# Patient Record
Sex: FEMALE | Race: OTHER | HISPANIC OR LATINO | URBAN - METROPOLITAN AREA
[De-identification: names, ages, dates, MRNs, and addresses within clinical notes are randomized per-mention and may not be internally consistent; named-entity substitution may affect disease eponyms.]

---

## 2024-07-29 ENCOUNTER — EMERGENCY (EMERGENCY)
Facility: HOSPITAL | Age: 19
LOS: 1 days | Discharge: HOME | End: 2024-07-29
Attending: EMERGENCY MEDICINE | Admitting: EMERGENCY MEDICINE
Payer: MEDICAID

## 2024-07-29 VITALS
HEIGHT: 63 IN | DIASTOLIC BLOOD PRESSURE: 79 MMHG | SYSTOLIC BLOOD PRESSURE: 131 MMHG | OXYGEN SATURATION: 100 % | TEMPERATURE: 98 F | WEIGHT: 149.91 LBS | HEART RATE: 80 BPM | RESPIRATION RATE: 17 BRPM

## 2024-07-29 PROCEDURE — 99284 EMERGENCY DEPT VISIT MOD MDM: CPT

## 2024-07-29 RX ORDER — CIPROFLOXACIN AND FLUOCINOLONE ACETONIDE .75; .0625 MG/.25ML; MG/.25ML
0.25 SOLUTION AURICULAR (OTIC) ONCE
Refills: 0 | Status: DISCONTINUED | OUTPATIENT
Start: 2024-07-29 | End: 2024-07-30

## 2024-07-29 NOTE — ED PROVIDER NOTE - CLINICAL SUMMARY MEDICAL DECISION MAKING FREE TEXT BOX
Left ear pain.  No systemic symptoms to suggest otitis media or sinus congestion.  There is swelling of the canal so otitis externa is a possibility.  Will treat with topical antibiotics and refer to ENT for follow-up.

## 2024-07-29 NOTE — ED ADULT TRIAGE NOTE - AS TEMP SITE
This is being sent to you without review by the Triage staff due to the high call volumes created by the COVID-19 virus. Thank you for your support.      Centralized Nurse Triage Team
oral

## 2024-07-29 NOTE — ED PROVIDER NOTE - CARE PROVIDER_API CALL
Halley Holbrook  Otolaryngology  [address] [place] [id number]  Phone: [phone]  Fax: [phone]  Follow Up Time: 1-3 Days

## 2024-07-29 NOTE — ED PROVIDER NOTE - PATIENT PORTAL LINK FT
You can access the FollowMyHealth Patient Portal offered by [facility] by registering at the following website: [email or web address]. By joining [employer]’s FollowMyHealth portal, you will also be able to view your health information using other applications (apps) compatible with our system.

## 2024-07-29 NOTE — ED ADULT TRIAGE NOTE - CHIEF COMPLAINT QUOTE
PT c/o left sided facial pain x 2 days. Endorses swelling. Denies fever, chills, drainage from ear, hearing loss, No Past Medical History

## 2024-07-29 NOTE — ED PROVIDER NOTE - NSFOLLOWUPINSTRUCTIONS_ED_ALL_ED_FT
Follow up with Dr. Holbrook, ENT doctor in 2-3 days.    Take ibuprofen 600mg every 6 hours as needed for pain for 3-5 days maximum.  Take acetaminophen 975mg every 6 hours as needed for pain.  YOU CAN TAKE BOTH OF THESE MEDICATIONS AT THE SAME TIME.     Use the Cipro ear drops, 3 drops in the left ear twice a day for 7 days.    Return for worse pain, fever, vomiting or other emergent concerns.

## 2024-07-30 ENCOUNTER — EMERGENCY (EMERGENCY)
Facility: HOSPITAL | Age: 19
LOS: 1 days | Discharge: HOME | End: 2024-07-30
Attending: STUDENT IN AN ORGANIZED HEALTH CARE EDUCATION/TRAINING PROGRAM | Admitting: STUDENT IN AN ORGANIZED HEALTH CARE EDUCATION/TRAINING PROGRAM
Payer: MEDICAID

## 2024-07-30 VITALS
HEART RATE: 85 BPM | TEMPERATURE: 98 F | OXYGEN SATURATION: 99 % | RESPIRATION RATE: 18 BRPM | DIASTOLIC BLOOD PRESSURE: 82 MMHG | WEIGHT: 149.91 LBS | SYSTOLIC BLOOD PRESSURE: 135 MMHG | HEIGHT: 63 IN

## 2024-07-30 VITALS
TEMPERATURE: 98 F | SYSTOLIC BLOOD PRESSURE: 111 MMHG | DIASTOLIC BLOOD PRESSURE: 73 MMHG | RESPIRATION RATE: 16 BRPM | OXYGEN SATURATION: 100 % | HEART RATE: 73 BPM

## 2024-07-30 PROBLEM — Z78.9 OTHER SPECIFIED HEALTH STATUS: Chronic | Status: ACTIVE | Noted: 2020-08-04

## 2024-07-30 LAB
ALBUMIN SERPL ELPH-MCNC: 3.9 G/DL — SIGNIFICANT CHANGE UP (ref 3.3–5)
ALP SERPL-CCNC: 80 U/L — SIGNIFICANT CHANGE UP (ref 40–120)
ALT FLD-CCNC: 13 U/L — SIGNIFICANT CHANGE UP (ref 4–33)
ANION GAP SERPL CALC-SCNC: 14 MMOL/L — SIGNIFICANT CHANGE UP (ref 7–14)
ANISOCYTOSIS BLD QL: SIGNIFICANT CHANGE UP
AST SERPL-CCNC: 28 U/L — SIGNIFICANT CHANGE UP (ref 4–32)
BASOPHILS # BLD AUTO: 0 K/UL — SIGNIFICANT CHANGE UP (ref 0–0.2)
BASOPHILS NFR BLD AUTO: 0 % — SIGNIFICANT CHANGE UP (ref 0–2)
BILIRUB SERPL-MCNC: 0.4 MG/DL — SIGNIFICANT CHANGE UP (ref 0.2–1.2)
BUN SERPL-MCNC: 16 MG/DL — SIGNIFICANT CHANGE UP (ref 7–23)
CALCIUM SERPL-MCNC: 8.8 MG/DL — SIGNIFICANT CHANGE UP (ref 8.4–10.5)
CHLORIDE SERPL-SCNC: 104 MMOL/L — SIGNIFICANT CHANGE UP (ref 98–107)
CO2 SERPL-SCNC: 18 MMOL/L — LOW (ref 22–31)
CREAT SERPL-MCNC: 0.63 MG/DL — SIGNIFICANT CHANGE UP (ref 0.5–1.3)
EGFR: 130 ML/MIN/1.73M2 — SIGNIFICANT CHANGE UP
EOSINOPHIL # BLD AUTO: 0 K/UL — SIGNIFICANT CHANGE UP (ref 0–0.5)
EOSINOPHIL NFR BLD AUTO: 0 % — SIGNIFICANT CHANGE UP (ref 0–6)
GLUCOSE SERPL-MCNC: 82 MG/DL — SIGNIFICANT CHANGE UP (ref 70–99)
HCT VFR BLD CALC: 31.7 % — LOW (ref 34.5–45)
HGB BLD-MCNC: 9.8 G/DL — LOW (ref 11.5–15.5)
HYPOCHROMIA BLD QL: SIGNIFICANT CHANGE UP
IANC: 6.44 K/UL — SIGNIFICANT CHANGE UP (ref 1.8–7.4)
LYMPHOCYTES # BLD AUTO: 2.41 K/UL — SIGNIFICANT CHANGE UP (ref 1–3.3)
LYMPHOCYTES # BLD AUTO: 25.9 % — SIGNIFICANT CHANGE UP (ref 13–44)
MCHC RBC-ENTMCNC: 21.4 PG — LOW (ref 27–34)
MCHC RBC-ENTMCNC: 30.9 GM/DL — LOW (ref 32–36)
MCV RBC AUTO: 69.2 FL — LOW (ref 80–100)
MICROCYTES BLD QL: SIGNIFICANT CHANGE UP
MONOCYTES # BLD AUTO: 0.8 K/UL — SIGNIFICANT CHANGE UP (ref 0–0.9)
MONOCYTES NFR BLD AUTO: 8.6 % — SIGNIFICANT CHANGE UP (ref 2–14)
NEUTROPHILS # BLD AUTO: 5.93 K/UL — SIGNIFICANT CHANGE UP (ref 1.8–7.4)
NEUTROPHILS NFR BLD AUTO: 63.8 % — SIGNIFICANT CHANGE UP (ref 43–77)
PLAT MORPH BLD: ABNORMAL
PLATELET # BLD AUTO: 271 K/UL — SIGNIFICANT CHANGE UP (ref 150–400)
PLATELET COUNT - ESTIMATE: NORMAL — SIGNIFICANT CHANGE UP
POIKILOCYTOSIS BLD QL AUTO: SLIGHT — SIGNIFICANT CHANGE UP
POLYCHROMASIA BLD QL SMEAR: SIGNIFICANT CHANGE UP
POTASSIUM SERPL-MCNC: 4.6 MMOL/L — SIGNIFICANT CHANGE UP (ref 3.5–5.3)
POTASSIUM SERPL-SCNC: 4.6 MMOL/L — SIGNIFICANT CHANGE UP (ref 3.5–5.3)
PROT SERPL-MCNC: 7.4 G/DL — SIGNIFICANT CHANGE UP (ref 6–8.3)
RBC # BLD: 4.58 M/UL — SIGNIFICANT CHANGE UP (ref 3.8–5.2)
RBC # FLD: 19 % — HIGH (ref 10.3–14.5)
RBC BLD AUTO: ABNORMAL
SODIUM SERPL-SCNC: 136 MMOL/L — SIGNIFICANT CHANGE UP (ref 135–145)
VARIANT LYMPHS # BLD: 1.7 % — SIGNIFICANT CHANGE UP (ref 0–6)
WBC # BLD: 9.29 K/UL — SIGNIFICANT CHANGE UP (ref 3.8–10.5)
WBC # FLD AUTO: 9.29 K/UL — SIGNIFICANT CHANGE UP (ref 3.8–10.5)

## 2024-07-30 PROCEDURE — 99285 EMERGENCY DEPT VISIT HI MDM: CPT

## 2024-07-30 PROCEDURE — 70481 CT ORBIT/EAR/FOSSA W/DYE: CPT | Mod: 26,MC

## 2024-07-30 RX ORDER — CEFEPIME 1 G/1
2000 INJECTION, POWDER, FOR SOLUTION INTRAMUSCULAR; INTRAVENOUS ONCE
Refills: 0 | Status: COMPLETED | OUTPATIENT
Start: 2024-07-30 | End: 2024-07-30

## 2024-07-30 RX ORDER — VANCOMYCIN HYDROCHLORIDE 50 MG/ML
1000 KIT ORAL ONCE
Refills: 0 | Status: COMPLETED | OUTPATIENT
Start: 2024-07-30 | End: 2024-07-30

## 2024-07-30 RX ORDER — ACETAMINOPHEN 325 MG
1000 TABLET ORAL ONCE
Refills: 0 | Status: COMPLETED | OUTPATIENT
Start: 2024-07-30 | End: 2024-07-30

## 2024-07-30 RX ORDER — CIPROFLOXACIN HYDROCHLORIDE AND HYDROCORTISONE 2; 10 MG/ML; MG/ML
3 SUSPENSION AURICULAR (OTIC) ONCE
Refills: 0 | Status: ACTIVE | OUTPATIENT
Start: 2024-07-30 | End: 2024-07-30

## 2024-07-30 RX ORDER — ACETAMINOPHEN 325 MG
1000 TABLET ORAL ONCE
Refills: 0 | Status: DISCONTINUED | OUTPATIENT
Start: 2024-07-30 | End: 2024-07-31

## 2024-07-30 RX ADMIN — CEFEPIME 100 MILLIGRAM(S): 1 INJECTION, POWDER, FOR SOLUTION INTRAMUSCULAR; INTRAVENOUS at 15:36

## 2024-07-30 RX ADMIN — Medication 600 MILLIGRAM(S): at 00:24

## 2024-07-30 RX ADMIN — Medication 600 MILLIGRAM(S): at 20:49

## 2024-07-30 RX ADMIN — VANCOMYCIN HYDROCHLORIDE 250 MILLIGRAM(S): KIT at 16:45

## 2024-07-30 RX ADMIN — Medication 400 MILLIGRAM(S): at 14:52

## 2024-07-30 NOTE — ED PROVIDER NOTE - PROGRESS NOTE DETAILS
MD Mchugh: Patient initially signed out to me pending CT IAC d/t concern for mastoiditis. CT concerning for otitis externa w/ mild otomastoiditis. ENT consulted for eval. Per ENT, no need for acute intervention. Culture sent off and Left ear wick placed. Recommending Ciprodex gtts to affected ear and PO augmentin, then outpatient ENT follow up. Pt was re-evaluated at bedside, VSS, feeling better overall. Results were discussed with patient as well as return precautions and follow up plan with PCP and/or ENT. Time was taken to answer any questions that the patient had before providing them with discharge paperwork.

## 2024-07-30 NOTE — ED ADULT NURSE REASSESSMENT NOTE - NS ED NURSE REASSESS COMMENT FT1
pt remains a&ox4, c/o left ear pain. Pt denying chest pain, sob, n+v, headache, dizziness at this time. Breathing even, unlabored. Pending ENT.

## 2024-07-30 NOTE — ED PROVIDER NOTE - PHYSICAL EXAMINATION
Const: Awake, alert, no acute distress.  Well appearing.  Moving comfortably on stretcher.  HEENT: NC/AT.  Moist mucous membranes.  No pharyngeal erythema, no exudates. Extraocular movements intact b/l.  Conjunctiva pink.  No scleral icterus. Narrowed external auditory canal, poorly visualized tympanic membrane, purulent drainage from the left ear. TTP over the left mastoid area w/ mild erythema.   Neck: Neck supple, full ROM without pain.  Resp: Speaking in full sentences. No evidence of respiratory distress.   Normal chest excursion.   Neuro: Awake, alert & oriented x 3.  Moves all extremities spontaneously.  No focal deficits.

## 2024-07-30 NOTE — ED ADULT NURSE NOTE - NS ED NOTE  TALK SOMEONE YN
Patient reports a stomach ache since this morning, denies nausea reports that she had a little diarrhea this morning but that has subsided. Denies any fevers. No

## 2024-07-30 NOTE — ED PROVIDER NOTE - PATIENT PORTAL LINK FT
You can access the FollowMyHealth Patient Portal offered by [facility] by registering at the following website: [email or web address]. By joining [employer]’s FollowMyHealth portal, you will also be able to view your health information using other applications (apps) compatible with our system.

## 2024-07-30 NOTE — ED ADULT NURSE NOTE - OBJECTIVE STATEMENT
Patient received in intake room 15. Patient is AOx4, ambulatory, able to speak in full sentences, c/o left sided facial and ear pain. Patient has no past medical history. Patient states she has been having this facial/ear pain with decreased hearing in left ear. Patient medicated per chart. Patient appears comfortable on stretcher. Patient in no signs of acute distress. Patient discharged pending ear drops for ear infection. Respirations even and unlabored, chest rise symmetrical b/l. Safety maintained.

## 2024-07-30 NOTE — ED PROVIDER NOTE - NSFOLLOWUPINSTRUCTIONS_ED_ALL_ED_FT
Otitis Externa    Otitis externa is a bacterial or fungal infection of the outer ear canal. This is the area from the eardrum to the outside of the ear. Otitis externa is sometimes called "swimmer's ear." Causes include swimming in dirty water, moisture remaining in ear after swimming or bathing, trauma to the ear, objects stuck in the ear, or cuts or scrapes in the outside of the ear. Symptoms include itching, pain, swelling, redness in the ear canal, and fluid coming from the ear. To prevent recurrence of otitis externa, keep your ear dry, avoid scratching or putting objects inside your ear including cotton swabs, and avoid swimming in polluted water or poorly chlorinated pools.    Please use Ciprodex drops to affected ear twice a day (5 drops) and Augmentin 1 tab twice a day for 5 days.     Please keep your Friday appointment with your ENT to remove ear wick placed in ED.    SEEK IMMEDIATE MEDICAL CARE IF YOU HAVE ANY OF THE FOLLOWING SYMPTOMS: fever, worsening symptoms, headache, redness/swelling/pain over the bone behind your ear.

## 2024-07-30 NOTE — ED PROVIDER NOTE - CLINICAL SUMMARY MEDICAL DECISION MAKING FREE TEXT BOX
19-year-old female returns to the ED due to worsening left-sided ear pain.  Patient states that she was brought in and started on antibiotics yesterday however the ear pain has not subsided.  Patient also endorses pain ear pulling and pain behind her ear.  Patient denies any nausea, vomiting, fever. DDx includes malignant otitis externa, mastoiditis.  Dispo pending labs, imaging and reassessment.

## 2024-07-30 NOTE — ED PROVIDER NOTE - WET READ LAUNCH FT
There are no Wet Read(s) to document. Otolaryngologist Procedure Text (E): After obtaining clear surgical margins the patient was sent to otolaryngology for surgical repair.  The patient understands they will receive post-surgical care and follow-up from the referring physician's office.

## 2024-07-30 NOTE — ED PROVIDER NOTE - ATTENDING CONTRIBUTION TO CARE
I have discussed the patient's case presentation with resident. I have also personally performed a face-to-face evaluation of the patient. I agree with the resident's assessment and plan.    Exam highly concerning for malignant OE. Will pursue CT IAC and consult ENT if there is e/o mastoiditis. Will start vancomycin and cefepime for now.

## 2024-07-31 RX ORDER — ACETAMINOPHEN 325 MG
975 TABLET ORAL ONCE
Refills: 0 | Status: COMPLETED | OUTPATIENT
Start: 2024-07-31 | End: 2024-07-31

## 2024-07-31 RX ORDER — OXYCODONE HYDROCHLORIDE 100 MG/5ML
1 SOLUTION ORAL
Qty: 12 | Refills: 0
Start: 2024-07-31 | End: 2024-08-02

## 2024-07-31 RX ORDER — AMOXICILLIN/POTASSIUM CLAV 250-125 MG
1 TABLET ORAL
Qty: 10 | Refills: 0
Start: 2024-07-31 | End: 2024-08-04

## 2024-07-31 RX ORDER — CIPROFLOXACIN AND DEXAMETHASONE 3; 1 MG/ML; MG/ML
5 SUSPENSION/ DROPS AURICULAR (OTIC)
Qty: 1 | Refills: 0
Start: 2024-07-31 | End: 2024-08-04

## 2024-07-31 RX ADMIN — Medication 975 MILLIGRAM(S): at 00:08

## 2024-07-31 NOTE — CONSULT NOTE ADULT - SUBJECTIVE AND OBJECTIVE BOX
HPI: 19y Female presenting with L ear pain for past three days. Patient says ear began to hurt three days ago, she came to the ED yesterday and was placed on otic drops but had severe pain today so she re-presented. Endorses muffled hearing in left ear and intermittent tinnitus. No drainage from ear. No recent ear infections or ear tubes. No recent swimming or water exposure. No fevers at home. No dizziness or vertigo.     Allergies    No Known Allergies    Intolerances        PAST MEDICAL & SURGICAL HISTORY:  No pertinent past medical history      No pertinent past medical history      No significant past surgical history      No significant past surgical history          FAMILY HISTORY:        MEDICATIONS:      All other PRN medications:      Vital Signs Last 24 Hrs  T(C): 36.8 (30 Jul 2024 20:43), Max: 36.8 (30 Jul 2024 20:43)  T(F): 98.2 (30 Jul 2024 20:43), Max: 98.2 (30 Jul 2024 20:43)  HR: 73 (30 Jul 2024 20:43) (73 - 85)  BP: 111/73 (30 Jul 2024 20:43) (111/73 - 135/82)  BP(mean): --  RR: 16 (30 Jul 2024 20:43) (16 - 18)  SpO2: 100% (30 Jul 2024 20:43) (99% - 100%)    Parameters below as of 30 Jul 2024 20:43  Patient On (Oxygen Delivery Method): room air        LABS:  CBC-    07-30    136  |  104  |  16  ----------------------------<  82  4.6   |  18<L>  |  0.63    Ca    8.8      30 Jul 2024 14:12    TPro  7.4  /  Alb  3.9  /  TBili  0.4  /  DBili  x   /  AST  28  /  ALT  13  /  AlkPhos  80  07-30    Coagulation Studies-    Endocrine Panel-  --  --  8.8 mg/dL    Physical Exam:  General: well-developed, NAD  OU: EOMI; PERRL; no drainage or redness  AU: R - no mastoid tip TTP, TM no effusion, no erythema. L - no mastoid tip TTP, +tragal TTP, EAC edema w/ yellow-white discharge, suctioned, TM not well visualized due to edema, wick placed  Nose: nares patent  Mouth: No oral lesions; no gross abnormalities  Neck: trachea midline. L level 2 LN, nontender, mobile  Respiratory: unlabored respirations  Cardiovascular: regular rate  Gastrointestinal: Soft, nondistended  Extremities: No edema, warm and well perfused  Skin: No lesions; no rash  CN 7 intact b/l    Imaging:  < from: CT Internal Auditory Canals w/ IV Cont (07.30.24 @ 18:32) >    FINDINGS:    On the right, the external auditory canal, tympanic membrane, middle ear   cavity, ossicles, cochlea, vestibule, semicircular canals, mastoid,   aditus ad antrum, and internal auditory canals are all negative. The   scutum is sharp and there are no ossicular erosions. The round window   niche, sinus tympani, and facial nerve recess are clear. The facial nerve   and carotid canal follow the usual trajectory. The vestibular aqueduct is   not distended.    On the left, there is marked thickening of the cartilaginous segment of   the external auditory canal, with complete opacification of the lumen of   the skeletal segment. This becomes confluent with the tympanic membrane.   There are associated mild mucosal opacities in the middle ear cavity,   with opacification of Prussak space. The stapes is not well seen due to   volume averaging artifact with adjacent mucous. The round window niche,   sinus tympani, and facial nerve recess are opacified. There are mild   mucosal densities in the mastoid cavity, without coalescence or septal   destruction.    The appearance is likely postinflammatory, with external otitis and mild   otomastoiditis. Correlation with direct inspection will be required to   exclude cholesteatoma.    There is reactive lymphadenopathy in the left neck and left parotid   space, with a dominant left parotid nodule measuring 10 mm. Anindex left   level IIa lymph node measures 12 x 8 x 16 mm. Follow-up to resolution   recommended to exclude neoplasm.    Otherwise, the left ossicles, cochlea, vestibule, semicircular canals,   mastoid, aditus ad antrum, and internal auditory canals are otherwise   negative. The scutum is sharp and there are no ossicular erosions. The   facial nerve and carotid canal follow the usual trajectory. The   vestibular aqueduct is not distended.    There are mild to moderate retention cysts in the left maxillary sinus,   and minimal mucus in the left maxillary sinus. There are no air-fluid   levels. Limited views of the brain parenchyma at the skull base and the   sinus cavities appear otherwise unremarkable.      IMPRESSION:    1.  Left external otitis, with mild otomastoiditis and reactive   adenopathy.    < end of copied text >

## 2024-07-31 NOTE — CONSULT NOTE ADULT - ASSESSMENT
19yF no PMH p/w L ear pain for past 3d, was seen in ED 7/29 and discharged on otic drops but returned due to significant ear pain. Exam c/w L OE, wick placed. CT IAC reviewed and c/f mild L mastoid effusion, and L level 2 LN.    Plan:  - Patient has appointment to have ear wick removed on Friday  - Augmentin BID x 10d  - Ciprodex gtt 5 drops to L ear BID x 7d  - F/u L ear culture  - Monitor enlarged lymph node, likely reactive from acute OE    Page ENT with any questions/concerns.  Peds Page #[id number]  Adult Page #[id number]

## 2024-08-02 PROBLEM — Z78.9 NON-SMOKER: Status: ACTIVE | Noted: 2024-08-02

## 2024-08-02 PROBLEM — H65.199 ACUTE OTITIS MEDIA WITH EFFUSION: Status: ACTIVE | Noted: 2024-08-02

## 2024-08-02 PROBLEM — H69.92 DYSFUNCTION OF LEFT EUSTACHIAN TUBE: Status: ACTIVE | Noted: 2024-08-02

## 2024-08-02 PROBLEM — H60.502 ACUTE OTITIS EXTERNA OF LEFT EAR, UNSPECIFIED TYPE: Status: ACTIVE | Noted: 2024-08-02

## 2024-08-02 PROBLEM — Z86.2 HISTORY OF ANEMIA: Status: RESOLVED | Noted: 2024-08-02 | Resolved: 2024-08-02

## 2024-08-02 LAB
CULTURE RESULTS: NO GROWTH — SIGNIFICANT CHANGE UP
SPECIMEN SOURCE: SIGNIFICANT CHANGE UP